# Patient Record
Sex: FEMALE | Race: WHITE | NOT HISPANIC OR LATINO | Employment: PART TIME | URBAN - METROPOLITAN AREA
[De-identification: names, ages, dates, MRNs, and addresses within clinical notes are randomized per-mention and may not be internally consistent; named-entity substitution may affect disease eponyms.]

---

## 2017-08-01 ENCOUNTER — PROCEDURE VISIT (OUTPATIENT)
Dept: OBSTETRICS AND GYNECOLOGY | Age: 22
End: 2017-08-01

## 2017-08-01 ENCOUNTER — OFFICE VISIT (OUTPATIENT)
Dept: OBSTETRICS AND GYNECOLOGY | Age: 22
End: 2017-08-01

## 2017-08-01 VITALS
BODY MASS INDEX: 22.66 KG/M2 | WEIGHT: 141 LBS | SYSTOLIC BLOOD PRESSURE: 100 MMHG | DIASTOLIC BLOOD PRESSURE: 68 MMHG | HEIGHT: 66 IN

## 2017-08-01 DIAGNOSIS — N20.0 KIDNEY STONES: ICD-10-CM

## 2017-08-01 DIAGNOSIS — K56.1 INTUSSUSCEPTION OF SMALL BOWEL (HCC): ICD-10-CM

## 2017-08-01 DIAGNOSIS — R10.2 PELVIC PAIN IN FEMALE: Primary | ICD-10-CM

## 2017-08-01 DIAGNOSIS — Z98.51 H/O TUBAL LIGATION: ICD-10-CM

## 2017-08-01 DIAGNOSIS — R10.32 LEFT LOWER QUADRANT ABDOMINAL PAIN OF UNKNOWN ETIOLOGY: Primary | ICD-10-CM

## 2017-08-01 DIAGNOSIS — R10.32 ABDOMINAL PAIN, LEFT LOWER QUADRANT: ICD-10-CM

## 2017-08-01 PROCEDURE — 99204 OFFICE O/P NEW MOD 45 MIN: CPT | Performed by: OBSTETRICS & GYNECOLOGY

## 2017-08-01 PROCEDURE — 76830 TRANSVAGINAL US NON-OB: CPT | Performed by: OBSTETRICS & GYNECOLOGY

## 2017-08-01 RX ORDER — ACETAMINOPHEN 325 MG/1
325 TABLET ORAL EVERY 6 HOURS PRN
COMMUNITY

## 2017-08-01 RX ORDER — ACETAMINOPHEN 500 MG
500 TABLET ORAL EVERY 6 HOURS PRN
COMMUNITY

## 2017-08-01 NOTE — PROGRESS NOTES
Subjective     Chief Complaint   Patient presents with   • Gynecologic Exam     New pt:LLQ pain since tubal 2 yrs ago       History of Present Illness  April Choi is a 22 y.o. female is being seen today for GYN consultation to evaluate her ongoing left lower quadrant pain. She is a  white female with below outlined medical history . She states she underwent a tubal ligation ,performed by Dr. Bala Varma in  and almost immediately noticed some subsequent left lower quadrant discomfort. It is described as intermittent, but at times intense. She recently had a CT scan that that revealed a small right ureteral stone ( nonobstructing), normal-appearing ovaries and uterus.  2 tubal ligation clips that were both identified on the left side, and a nonobstructing small bowel intussusception in the left lower quadrant.  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 She is sexually active without dyspareunia. No change in GI or  symptomatology. Cycles are usually 28-30 days and moderate in intensity and flow.  Chief Complaint   Patient presents with   • Gynecologic Exam     New pt:LLQ pain since tubal 2 yrs ago   .        The following portions of the patient's history were reviewed and updated as appropriate: allergies, current medications, past family history, past medical history, past social history, past surgical history and problem list.    PAST MEDICAL HISTORY  Past Medical History:   Diagnosis Date   • Abdominal pain    • Asthma    • Bronchitis    • History of chicken pox    • Kidney stones    • Migraine    • Pneumonia    • Scoliosis    • Yeast infection      OB History      Para Term  AB TAB SAB Ectopic Multiple Living    3 2 2       3        Past Surgical History:   Procedure Laterality Date   • TUBAL ABDOMINAL LIGATION       No family history on file.  History   Smoking Status   • Current Every Day Smoker    • Packs/day: 0.50   • Types: Cigarettes   Smokeless Tobacco   • Not on file       Current Outpatient Prescriptions:   •  acetaminophen (TYLENOL) 325 MG tablet, Take 325 mg by mouth Every 6 (Six) Hours As Needed for Mild Pain (1-3)., Disp: , Rfl:   •  acetaminophen (TYLENOL) 500 MG tablet, Take 500 mg by mouth Every 6 (Six) Hours As Needed for Mild Pain (1-3)., Disp: , Rfl:     There is no immunization history on file for this patient.    Review of Systems       Except as outlined in history of physical illness, patient denies any changes in her GYN, , GI systems. All other systems reviewed are negative.Interestingly the pain that she describes as almost always in the left lower quadrant close to her left anterior iliac crest    Objective   Physical Exam  General: Patient is alert and oriented and appears overall healthy  Neck: Is supple without thyromegaly, no carotid bruits and no lymphadenopathy  Breasts: Even and symmetrical, there is no lymphadenopathy, no erythema, no               discharge, no masses or tenderness  Lungs: Clear bilaterally, no wheezing, rhonchi, or rales.  Respiratory rate is normal  Heart: Regular rate and rhythm are appreciated, no murmurs or rubs are heard  Abdomen: Is soft, without organomegaly, bowel sounds are positive, there is no rebound or guarding. A small well-healed laparoscopic scar is noted in the inferior portion of the umbilicus. Moderate palpation next to the left anterior iliac spine causes abdominal wall discomfort.  Back: Nontender without CVA tenderness  Pelvic: External genitalia appear normal and consistent with mature female.  BUS normal                            Vagina is clean dry without discharge and appears adequately estrogenized, no               lesions or masses are present                         Cervix is noninflamed without discharge or lesions.  There is no cervical motion             tenderness.                Uterus is nonenlarged, without  tenderness, and no masses or abnormalities are  present               Adnexa are non-enlarged, non tender               Rectal exam reveals adequate sphincter tone and no masses or lesions are                     appreciated on digital rectal examination.    EMR Dragon/ Transcription disclaimer:  Much of the encounter note is an electronic transcription/translation of spoken language to printed text. The electronic translation of spoken language may permit erroneous, or at times, nonessential words or phrases to be inadvertently transcribes; Although i have reviewed the note for such errors, some may still exist.    Assessment/Plan   April was seen today for gynecologic exam.    Diagnoses and all orders for this visit:    Left lower quadrant abdominal pain of unknown etiology  Her pain is atypical and intermittent. In an effort to rule out any gynecological components a GYN ultrasound was performed today and found to be normal. A CT scan performed on July 31, 2017 revealed normal-appearing uterus and ovaries with no free fluid. Her exam today was only significant for abdominal wall pain with moderate palpation as outlined above. There was no uterine, adnexal, cervical discomfort even with deep palpation and movement. A vaginal panel swab was performed in an effort to rule out any forms of infection. There were no clinical indications of cervicitis or PID. Essentially there does not seem to be any significant gynecological component to this atypical left lower quadrant pain that she has been experiencing. . We will await the report of her vaginal culture/swabs.    Intussusception of small bowel  This is nonobstructing and patient was aware of this finding. She is scheduled for an endoscopy, and we discussed the possibility of surgical consultation at a later date. It is also discussed that this location is in the same quadrant of her abdomen that her pain is found.  The importance follow-up was reviewed with  April.    Kidney stones  Asymptomatic at present and on the opposite side of her abdominal discomfort    H/O tubal ligation  Had her tubal ligation in 2015 and was told it was an uneventful procedure. She is concerned that both clips are visualized on the left side. I discussed the potential different anatomical positions of the fallopian tubes and a woman's pelvis, as well as the angle of visualization,that  could have an effect on this finding. We also discussed that, if desired,  a hysterosalpingogram could be performed to definitively document bilateral tubal occlusion. Patient will give that some thought and if she would like to proceed with that procedure will either call our office or discuss with Dr. Lynch.     This very pleasant patient seemed to voice understanding of all of the issues we discussed.             EMR Dragon/ Transcription disclaimer:  Much of the encounter note is an electronic transcription/translation of spoken language to printed text. The electronic translation of spoken language may permit erroneous, or at times, nonessential words or phrases to be inadvertently transcribes; Although i have reviewed the note for such errors, some may still exist.

## 2017-08-03 LAB
C TRACH RRNA SPEC QL NAA+PROBE: NEGATIVE
N GONORRHOEA RRNA SPEC QL NAA+PROBE: NEGATIVE
T VAGINALIS RRNA SPEC QL NAA+PROBE: NEGATIVE

## 2017-08-23 ENCOUNTER — TELEPHONE (OUTPATIENT)
Dept: OBSTETRICS AND GYNECOLOGY | Age: 22
End: 2017-08-23

## 2017-08-23 NOTE — TELEPHONE ENCOUNTER
Pt called and decided she would like to proceed with Hysterosalpingogram as discussed at her appointment on 8/1/17.

## 2017-08-24 DIAGNOSIS — Z31.9 INFERTILITY MANAGEMENT: Primary | ICD-10-CM

## 2017-09-05 ENCOUNTER — HOSPITAL ENCOUNTER (OUTPATIENT)
Dept: GENERAL RADIOLOGY | Facility: HOSPITAL | Age: 22
Discharge: HOME | End: 2017-09-05
Attending: OBSTETRICS & GYNECOLOGY | Admitting: OBSTETRICS & GYNECOLOGY

## 2017-09-05 DIAGNOSIS — Z31.9 INFERTILITY MANAGEMENT: ICD-10-CM

## 2017-09-05 PROCEDURE — 74740 X-RAY FEMALE GENITAL TRACT: CPT

## 2018-12-05 ENCOUNTER — PROCEDURE VISIT (OUTPATIENT)
Dept: OBSTETRICS AND GYNECOLOGY | Age: 23
End: 2018-12-05

## 2018-12-05 ENCOUNTER — OFFICE VISIT (OUTPATIENT)
Dept: OBSTETRICS AND GYNECOLOGY | Age: 23
End: 2018-12-05

## 2018-12-05 VITALS
WEIGHT: 159 LBS | HEIGHT: 66 IN | SYSTOLIC BLOOD PRESSURE: 116 MMHG | BODY MASS INDEX: 25.55 KG/M2 | DIASTOLIC BLOOD PRESSURE: 62 MMHG

## 2018-12-05 DIAGNOSIS — R63.5 WEIGHT GAIN: ICD-10-CM

## 2018-12-05 DIAGNOSIS — N92.6 IRREGULAR BLEEDING: Primary | ICD-10-CM

## 2018-12-05 DIAGNOSIS — N92.6 IRREGULAR MENSES: Primary | ICD-10-CM

## 2018-12-05 DIAGNOSIS — R10.2 PELVIC PAIN: ICD-10-CM

## 2018-12-05 LAB
B-HCG UR QL: NEGATIVE
INTERNAL NEGATIVE CONTROL: NEGATIVE
INTERNAL POSITIVE CONTROL: POSITIVE
Lab: NORMAL

## 2018-12-05 PROCEDURE — 76830 TRANSVAGINAL US NON-OB: CPT | Performed by: PHYSICIAN ASSISTANT

## 2018-12-05 PROCEDURE — 81025 URINE PREGNANCY TEST: CPT | Performed by: PHYSICIAN ASSISTANT

## 2018-12-05 PROCEDURE — 99213 OFFICE O/P EST LOW 20 MIN: CPT | Performed by: PHYSICIAN ASSISTANT

## 2018-12-05 NOTE — PROGRESS NOTES
"Subjective     Chief Complaint   Patient presents with   • Gynecologic Exam     c/o heavy bleeding and abdominal cramping, had normal period on, then started bleeding and cramping on the .       April Choi is a 23 y.o.  whose LMP is Patient's last menstrual period was 2018. presents with irregular bleeding  Started normal period on the  and went through the   Started bleeding again on the  and had large blood clots  She went to work and within 2 hours her pad was saturated  Also notes pelvic pain  Says it happens daily 2-3 times a day or more  all her pain stared since her tubal ligation  Uses midol and tylenol that helps to ease it but doesn't go away  Seen at [facility] for irregular bleeding on the   She had an u/s done and blood work but was not told what was happening    She also has seen Dr Lynch for her issues.    She has an Intussusception of her small bowel and takes amitiza for her sx's    She does not believe her sx's are caused by that  Had her tubal done by Dr Varma but was referred to Dr Piedra by friends/family for a second opinion  She is overdue for her annual exam    No Additional Complaints Reported    The following portions of the patient's history were reviewed and updated as appropriate:vital signs, allergies, current medications, past family history, past medical history, past social history, past surgical history and problem list      Review of Systems   Genitourinary:positive for abnormal menstrual periods     Objective      /62   Ht 167.6 cm (66\")   Wt 72.1 kg (159 lb)   LMP 2018   Breastfeeding? No   BMI 25.66 kg/m²     Physical Exam    General:   alert, comfortable and no distress   Heart: Not performed today   Lungs: Not performed today.   Breast: Not performed today   Neck: na   Abdomen: {Not performed today   CVA: Not performed today   Pelvis: External genitalia: normal general appearance  Vaginal: normal mucosa " without prolapse or lesions and normal without tenderness, induration or masses  Cervix: normal appearance  Adnexa: normal bimanual exam and sl ttp on llq  Uterus: normal single, nontender   Extremities: Not performed today   Neurologic: negative   Psychiatric: Normal affect, judgement, and mood       Lab Review   Labs: Urine pregnancy test neg    Imaging   Ultrasound - Pelvic Vaginal  U/s shows endo thickness of 10.24 mm.  B ovaries wnl    Assessment/Plan     ASSESSMENT  1. Irregular bleeding    2. Pelvic pain    3. Weight gain        PLAN  1.   Orders Placed This Encounter   Procedures   • Urine Culture - Urine, Urine, Clean Catch   • TSH   • POC Pregnancy, Urine       2. U/S is reassuring. Disc tx options-BCP, meds.  Pt not able to tolerate NSAIDS but has had some success with diclofenac and will c/w that and heat.  I offered BCP but she says that made things worse and made her bleed more.  We discussed possible endometriosis, but no h/o these sx's prior to the TL.  Disc that a dxistic lap may be the most appropriate next step but also has risks associated with it.  She is going to schedule a f/u visit with Dr Piedra to discuss further.  She is also due for her annual exam  Pt declined STD testing.     Follow up: 4 week(s)    ELISA Winter  12/5/2018

## 2018-12-06 DIAGNOSIS — R79.89 LOW TSH LEVEL: Primary | ICD-10-CM

## 2018-12-06 LAB — TSH SERPL DL<=0.005 MIU/L-ACNC: <0.005 MIU/ML (ref 0.27–4.2)

## 2018-12-07 LAB
Lab: NORMAL
T3FREE SERPL-MCNC: 5.4 PG/ML (ref 2–4.4)
T4 FREE SERPL-MCNC: 2.02 NG/DL (ref 0.93–1.7)
WRITTEN AUTHORIZATION: NORMAL

## 2018-12-08 LAB
BACTERIA UR CULT: NORMAL
BACTERIA UR CULT: NORMAL

## 2018-12-18 ENCOUNTER — OFFICE VISIT (OUTPATIENT)
Dept: OBSTETRICS AND GYNECOLOGY | Age: 23
End: 2018-12-18

## 2018-12-18 VITALS
HEIGHT: 65 IN | WEIGHT: 158 LBS | DIASTOLIC BLOOD PRESSURE: 72 MMHG | BODY MASS INDEX: 26.33 KG/M2 | SYSTOLIC BLOOD PRESSURE: 118 MMHG

## 2018-12-18 DIAGNOSIS — Z00.00 ENCOUNTER FOR ANNUAL PHYSICAL EXAM: ICD-10-CM

## 2018-12-18 DIAGNOSIS — R10.84 GENERALIZED ABDOMINAL PAIN: ICD-10-CM

## 2018-12-18 DIAGNOSIS — N92.6 IRREGULAR MENSES: ICD-10-CM

## 2018-12-18 DIAGNOSIS — Z98.51 H/O TUBAL LIGATION: Primary | ICD-10-CM

## 2018-12-18 PROBLEM — Z13.29 THYROID DISORDER SCREENING: Status: ACTIVE | Noted: 2018-12-18

## 2018-12-18 PROCEDURE — 99395 PREV VISIT EST AGE 18-39: CPT | Performed by: OBSTETRICS & GYNECOLOGY

## 2018-12-18 RX ORDER — CYCLOBENZAPRINE HCL 5 MG
5 TABLET ORAL 3 TIMES DAILY PRN
COMMUNITY

## 2018-12-18 RX ORDER — SUMATRIPTAN 25 MG/1
25 TABLET, FILM COATED ORAL
COMMUNITY

## 2018-12-18 NOTE — PROGRESS NOTES
Subjective     Chief Complaint   Patient presents with   • Gynecologic Exam     Annual:discuss irregular bleeding,increased abdominal painand headaches         History of Present Illness      April Choi is a very pleasant  23 y.o. female who presents for annual exam.  Mammo Exam none, Contraception tubal ligation, Exercise regularly    Patient continues to have a difficult social situation. 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 She has some friends from [place] who were very supportive and lending help.    She was seen recently for some atypical abdominal pain that she thinks started after she had her tubes tied. At that time cultures were negative, and ultrasound was completely normal and pelvic exam was completely normal. CBC which had been done emergency room was normal as well. Since that time the pain is not as much of an issue. She is very physically active at work and at home.    She also states that she's had a long history of irregular cycles and we had asked that she see an endocrinologist for thyroid screening abnormalities. That appointment is scheduled  She's had no new sexual partners. STD screening previously was negative UCG negative.      Obstetric History:  OB History      Para Term  AB Living    3 2 2     3    SAB TAB Ectopic Molar Multiple Live Births              3         Menstrual History:     Patient's last menstrual period was 2018 (approximate).       Sexual History:       Past Medical History:   Diagnosis Date   • Abdominal pain    • Asthma    • Bronchitis    • History of chicken pox    • History of irregular menstrual bleeding    • Kidney stones    • Migraine    • Pneumonia    • Scoliosis    • Yeast infection      Past Surgical History:   Procedure Laterality Date   • TUBAL ABDOMINAL LIGATION         SOCIAL Hx:      The following portions of the patient's history were reviewed and updated as  "appropriate: allergies, current medications, past family history, past medical history, past social history, past surgical history and problem list.    Review of Systems        Except as outlined in history of physical illness, patient denies any changes in her GYN, , GI systems.  All other systems reviewed were negative.         Objective   Physical Exam    /72   Ht 165.1 cm (65\")   Wt 71.7 kg (158 lb)   LMP 11/17/2018 (Approximate)   BMI 26.29 kg/m²     General: Patient is alert and oriented and appears overall healthy  Neck: Is supple without thyromegaly, no carotid bruits and no lymphadenopathy  Lungs: Clear bilaterally, no wheezing, rhonchi, or rales.  Respiratory rate is normal  Breast: Even, symmetrical, no lymphadenopathy, no retraction, no masses or cysts  Heart: Regular rate and rhythm are appreciated, no murmurs or rubs are heard  Abdomen: Is soft, without organomegaly, bowel sounds are positive, there is no                                rebound or guarding and palpation does not produce any discomfort  Back: Nontender without CVA tenderness  Pelvic: External genitalia appear normal and consistent with mature female.  BUS normal                            Vagina is clean dry without discharge and appears adequately estrogenized, no               lesions or masses are present                         Cervix is noninflamed without discharge or lesions.  There is no cervical motion             tenderness.                Uterus is nonenlarged, without tenderness, and no masses or abnormalities are  present               Adnexa are non-enlarged, non tender               Rectal exam reveals adequate sphincter tone and no masses or lesions are                     appreciated on digital rectal examination.    Patient Active Problem List   Diagnosis   • Kidney stones   • H/O tubal ligation   • Thyroid disorder screening   • Generalized abdominal pain                 Assessment/Plan   April was seen " today for gynecologic exam.    Diagnoses and all orders for this visit:    H/O tubal ligation    Encounter for annual physical exam    Irregular menses  Patient has not had any success with birth control pills, Depo-Provera, Mirena IUD in the past. She asked about a D&C and endometrial ablation and we discussed the risk and benefits of that. She's previously had her tubes tied.  Generalized abdominal pain  Atypical, almost sounds strong intestinal an etiology. Her previous pelvic exam and ultrasound were completely normal. STD cultures were negative. UCG negative. Don't have a lot more to offer at this stage if we were to move towards an endometrial ablation if she discussed we could always do a diagnostic laparoscopy recently at tubal everything was normal    Difficult social situation      Annual Well Woman Exam    Discussed today's findings and concerns with patient in detail.  Continue to recommend regular exercise to include cardiovascular and resistance training and breast self-exam. Wellness lab, mammography, and pap smear, in accordance with age guidelines.  Nutritional  recommendations  were discussed.    All of the patient's questions were addressed and answered, I have encouraged her to call for today's test results if she has not received them within 10 days.  Patient is advised to call with any change in her condition or with any other questions, otherwise return in 12 months for annual examination.

## 2018-12-20 LAB
C TRACH RRNA CVX QL NAA+PROBE: NEGATIVE
CONV .: NORMAL
CYTOLOGIST CVX/VAG CYTO: NORMAL
CYTOLOGY CVX/VAG DOC THIN PREP: NORMAL
DX ICD CODE: NORMAL
HIV 1 & 2 AB SER-IMP: NORMAL
N GONORRHOEA RRNA CVX QL NAA+PROBE: NEGATIVE
OTHER STN SPEC: NORMAL
PATH REPORT.FINAL DX SPEC: NORMAL
STAT OF ADQ CVX/VAG CYTO-IMP: NORMAL
T VAGINALIS RRNA SPEC QL NAA+PROBE: NEGATIVE

## 2019-01-23 ENCOUNTER — OFFICE VISIT (OUTPATIENT)
Dept: ENDOCRINOLOGY | Age: 24
End: 2019-01-23

## 2019-01-23 VITALS
WEIGHT: 156 LBS | BODY MASS INDEX: 25.07 KG/M2 | SYSTOLIC BLOOD PRESSURE: 116 MMHG | DIASTOLIC BLOOD PRESSURE: 72 MMHG | HEIGHT: 66 IN | RESPIRATION RATE: 16 BRPM

## 2019-01-23 DIAGNOSIS — E05.90 HYPERTHYROIDISM: Primary | ICD-10-CM

## 2019-01-23 DIAGNOSIS — R53.82 CHRONIC FATIGUE: ICD-10-CM

## 2019-01-23 DIAGNOSIS — N92.6 IRREGULAR MENSES: ICD-10-CM

## 2019-01-23 DIAGNOSIS — N20.0 KIDNEY STONES: ICD-10-CM

## 2019-01-23 PROCEDURE — 99204 OFFICE O/P NEW MOD 45 MIN: CPT | Performed by: INTERNAL MEDICINE

## 2019-01-23 RX ORDER — METHIMAZOLE 10 MG/1
TABLET ORAL DAILY
Refills: 3 | COMMUNITY
Start: 2019-01-15 | End: 2019-01-23

## 2019-01-23 RX ORDER — VITAMIN B COMPLEX
1 CAPSULE ORAL DAILY
Qty: 30 CAPSULE | Refills: 11 | Status: SHIPPED | OUTPATIENT
Start: 2019-01-23 | End: 2020-01-23

## 2019-01-23 NOTE — PROGRESS NOTES
"Subjective   April Choi is a 23 y.o. female seen as a new patient for abnormal TSH. She states that she was given Methimazole by Dr. Kamran Lynch in 12/2018. She is having fatigue, weight fluctuation, irregular periods, hypoglycemic episodes, trouble sleeping, headaches, dizziness, light headedness. She states that she has to take a muscle relaxer to be able to sleep at night and she does not feel any better since starting Methimazole.     History of Present Illness this is a 23-year-old female who has been referred for further evaluation and treatment of recently diagnosed thyrotoxicosis.  She said she has been having problem with pronounced and progressive fatigue and weight gain and menstrual abnormalities and finally on 12/05/2018 as her gynecologist's office a thyroid function test on which showed significantly elevated free T4 and free T3 as well as suppressed TSH.  She is also complaining of pronounced weakness especially in the upper arms as well as her thighs even though previously she had been complaining of constipation she now has frequent bowel movements and has trouble going to sleep and feeling hot all the time.  Her family history is significantly positive for thyroid issues as well as type II diabetes on mother's side of the family.  She is  and has 2 children and works at [employer].    /72   Resp 16   Ht 167.6 cm (66\")   Wt 70.8 kg (156 lb)   BMI 25.18 kg/m²      Allergies   Allergen Reactions   • Aspirin Hives   • Ibuprofen Hives   • Naproxen Itching       Current Outpatient Medications:   •  acetaminophen (TYLENOL) 325 MG tablet, Take 325 mg by mouth Every 6 (Six) Hours As Needed for Mild Pain (1-3)., Disp: , Rfl:   •  acetaminophen (TYLENOL) 500 MG tablet, Take 500 mg by mouth Every 6 (Six) Hours As Needed for Mild Pain (1-3)., Disp: , Rfl:   •  cyclobenzaprine (FLEXERIL) 5 MG tablet, Take 5 mg by mouth 3 (Three) Times a Day As Needed for Muscle Spasms., Disp: , " Rfl:   •  diclofenac (VOLTAREN) 50 MG EC tablet, Take 50 mg by mouth 2 (Two) Times a Day As Needed., Disp: , Rfl:   •  methIMAzole (TAPAZOLE) 10 MG tablet, Take  by mouth Daily., Disp: , Rfl: 3  •  SUMAtriptan (IMITREX) 25 MG tablet, Take 25 mg by mouth Every 2 (Two) Hours As Needed for Migraine. Take one tablet at onset of headache. May repeat dose one time in 2 hours if headache not relieved., Disp: , Rfl:       The following portions of the patient's history were reviewed and updated as appropriate: allergies, current medications, past family history, past medical history, past social history, past surgical history and problem list.    Review of Systems   Constitutional: Positive for fatigue and unexpected weight change.   Eyes: Negative.    Respiratory: Negative.    Cardiovascular: Negative.    Gastrointestinal: Negative.    Endocrine: Negative.    Genitourinary: Positive for menstrual problem.   Musculoskeletal: Negative.    Skin: Negative.    Allergic/Immunologic: Negative.    Neurological: Positive for dizziness, light-headedness and headaches.   Hematological: Negative.    Psychiatric/Behavioral: Positive for sleep disturbance.       Objective   Physical Exam   Constitutional: She is oriented to person, place, and time. She appears well-developed and well-nourished. No distress.   Anxious and nervous   HENT:   Head: Normocephalic and atraumatic.   Right Ear: External ear normal.   Left Ear: External ear normal.   Nose: Nose normal.   Mouth/Throat: Oropharynx is clear and moist. No oropharyngeal exudate.   Eyes: Conjunctivae and EOM are normal. Pupils are equal, round, and reactive to light. Right eye exhibits no discharge. Left eye exhibits no discharge. No scleral icterus.   Neck: Normal range of motion. Neck supple. No JVD present. No tracheal deviation present. Thyromegaly present.   Diffuse enlargement of her thyroid with meaty consistency to about 3-4 times normal.  The entire goiter moves freely with  swallowing and it is nontender.   Cardiovascular: Normal rate, regular rhythm, normal heart sounds and intact distal pulses. Exam reveals no gallop and no friction rub.   No murmur heard.  Pulmonary/Chest: Effort normal and breath sounds normal. No stridor. No respiratory distress. She has no wheezes. She has no rales. She exhibits no tenderness.   Abdominal: Soft. Bowel sounds are normal. She exhibits no distension and no mass. There is no tenderness. There is no rebound and no guarding. No hernia.   Musculoskeletal: Normal range of motion. She exhibits no edema, tenderness or deformity.   Lymphadenopathy:     She has no cervical adenopathy.   Neurological: She is alert and oriented to person, place, and time. She has normal reflexes. She displays normal reflexes. No cranial nerve deficit or sensory deficit. She exhibits normal muscle tone. Coordination normal.   Risk Simone the reflexes.  She also has trouble getting up from squatting position.   Skin: Skin is warm and dry. No rash noted. She is not diaphoretic. No erythema. No pallor.   Psychiatric: She has a normal mood and affect. Her behavior is normal. Judgment and thought content normal.   Nursing note and vitals reviewed.        Assessment/Plan   Diagnoses and all orders for this visit:    Hyperthyroidism  -     T4 & TSH ([facility])  -     T4, Free  -     T3, Free  -     Uric Acid  -     Vitamin D 25 Hydroxy  -     Comprehensive Metabolic Panel  -     C-Peptide  -     Hemoglobin A1c  -     Follicle Stimulating Hormone  -     Lipid Panel  -     Luteinizing Hormone  -     Prolactin  -     ACTH  -     Cortisol  -     Calcium, Ionized  -     Phosphorus  -     PTH, Intact  -     Thyroid Stimulating Immunoglobulin  -     T3, Free; Future  -     T4 & TSH ([facility]); Future  -     T4, Free; Future  -     Uric Acid; Future  -     Vitamin D 25 Hydroxy; Future  -     Comprehensive Metabolic Panel; Future  -     C-Peptide; Future  -     Hemoglobin A1c; Future  -     Lipid  Panel; Future  -     NM Thyroid Uptake & Scan; Future  -     NM Thyroid Therapy Ablation Basic; Future    Chronic fatigue  -     T4 & TSH ([facility])  -     T4, Free  -     T3, Free  -     Uric Acid  -     Vitamin D 25 Hydroxy  -     Comprehensive Metabolic Panel  -     C-Peptide  -     Hemoglobin A1c  -     Follicle Stimulating Hormone  -     Lipid Panel  -     Luteinizing Hormone  -     Prolactin  -     ACTH  -     Cortisol  -     Calcium, Ionized  -     Phosphorus  -     PTH, Intact  -     Thyroid Stimulating Immunoglobulin  -     T3, Free; Future  -     T4 & TSH ([facility]); Future  -     T4, Free; Future  -     Uric Acid; Future  -     Vitamin D 25 Hydroxy; Future  -     Comprehensive Metabolic Panel; Future  -     C-Peptide; Future  -     Hemoglobin A1c; Future  -     Lipid Panel; Future    Irregular menses  -     T4 & TSH ([facility])  -     T4, Free  -     T3, Free  -     Uric Acid  -     Vitamin D 25 Hydroxy  -     Comprehensive Metabolic Panel  -     C-Peptide  -     Hemoglobin A1c  -     Follicle Stimulating Hormone  -     Lipid Panel  -     Luteinizing Hormone  -     Prolactin  -     ACTH  -     Cortisol  -     Calcium, Ionized  -     Phosphorus  -     PTH, Intact  -     Thyroid Stimulating Immunoglobulin  -     T3, Free; Future  -     T4 & TSH ([facility]); Future  -     T4, Free; Future  -     Uric Acid; Future  -     Vitamin D 25 Hydroxy; Future  -     Comprehensive Metabolic Panel; Future  -     C-Peptide; Future  -     Hemoglobin A1c; Future  -     Lipid Panel; Future    Kidney stones  -     T4 & TSH ([facility])  -     T4, Free  -     T3, Free  -     Uric Acid  -     Vitamin D 25 Hydroxy  -     Comprehensive Metabolic Panel  -     C-Peptide  -     Hemoglobin A1c  -     Follicle Stimulating Hormone  -     Lipid Panel  -     Luteinizing Hormone  -     Prolactin  -     ACTH  -     Cortisol  -     Calcium, Ionized  -     Phosphorus  -     PTH, Intact  -     Thyroid Stimulating Immunoglobulin  -     T3, Free;  Future  -     T4 & TSH ([facility]); Future  -     T4, Free; Future  -     Uric Acid; Future  -     Vitamin D 25 Hydroxy; Future  -     Comprehensive Metabolic Panel; Future  -     C-Peptide; Future  -     Hemoglobin A1c; Future  -     Lipid Panel; Future    Other orders  -     metoprolol tartrate (LOPRESSOR) 25 MG tablet; Take 1 tablet by mouth 2 (Two) Times a Day.  -     b complex vitamins capsule; Take 1 capsule by mouth Daily.               In summary I saw and examined this 23-year-old female for above-mentioned problems.  I reviewed her laboratory evaluation of 12/05/2018 and at this point we will go ahead and order a more extensive laboratory evaluation and once the results come back we will go ahead and call for any possible modification or new medications.  At this time we are also going to ask her to stop taking Tapazole and schedule her for radioactive iodine uptake and scan and if positive to be followed by radioactive iodine therapy.  I am also starting her on metoprolol 25 mg twice daily and vitamin  B complex 1 tablets daily.  I also asked her to a Yusuf intensive exercise until such time that she has become euthyroid.  She will see Ms. Kristin Gutierrez in 3 months or sooner if needed with laboratory evaluation prior to each office visit.

## 2019-01-24 LAB
25(OH)D3+25(OH)D2 SERPL-MCNC: 41 NG/ML (ref 30–100)
ACTH PLAS-MCNC: 10.4 PG/ML (ref 7.2–63.3)
ALBUMIN SERPL-MCNC: 4.9 G/DL (ref 3.5–5.2)
ALBUMIN/GLOB SERPL: 1.6 G/DL
ALP SERPL-CCNC: 100 U/L (ref 39–117)
ALT SERPL-CCNC: 18 U/L (ref 1–33)
AST SERPL-CCNC: 15 U/L (ref 1–32)
BILIRUB SERPL-MCNC: 0.3 MG/DL (ref 0.1–1.2)
BUN SERPL-MCNC: 8 MG/DL (ref 6–20)
BUN/CREAT SERPL: 13.1 (ref 7–25)
C PEPTIDE SERPL-MCNC: 1.9 NG/ML (ref 1.1–4.4)
CA-I SERPL ISE-MCNC: 5.4 MG/DL (ref 4.5–5.6)
CALCIUM SERPL-MCNC: 10.2 MG/DL (ref 8.6–10.5)
CHLORIDE SERPL-SCNC: 102 MMOL/L (ref 98–107)
CHOLEST SERPL-MCNC: 131 MG/DL (ref 0–200)
CO2 SERPL-SCNC: 29.1 MMOL/L (ref 22–29)
CORTIS SERPL-MCNC: 4 UG/DL
CREAT SERPL-MCNC: 0.61 MG/DL (ref 0.57–1)
FSH SERPL-ACNC: 5.9 MIU/ML
GLOBULIN SER CALC-MCNC: 3.1 GM/DL
GLUCOSE SERPL-MCNC: 89 MG/DL (ref 65–99)
HBA1C MFR BLD: 4.8 % (ref 4.8–5.6)
HDLC SERPL-MCNC: 64 MG/DL (ref 40–60)
INTERPRETATION: NORMAL
LDLC SERPL CALC-MCNC: 57 MG/DL (ref 0–100)
LH SERPL-ACNC: 8.5 MIU/ML
Lab: NORMAL
PHOSPHATE SERPL-MCNC: 3.6 MG/DL (ref 2.5–4.5)
POTASSIUM SERPL-SCNC: 4.9 MMOL/L (ref 3.5–5.2)
PROLACTIN SERPL-MCNC: 13 NG/ML (ref 4.8–23.3)
PROT SERPL-MCNC: 8 G/DL (ref 6–8.5)
PTH-INTACT SERPL-MCNC: 39 PG/ML (ref 15–65)
SODIUM SERPL-SCNC: 142 MMOL/L (ref 136–145)
T3FREE SERPL-MCNC: 5.3 PG/ML (ref 2–4.4)
T4 FREE SERPL-MCNC: 2.13 NG/DL (ref 0.93–1.7)
T4 SERPL-MCNC: 11.56 MCG/DL (ref 4.5–11.7)
TRIGL SERPL-MCNC: 48 MG/DL (ref 0–150)
TSH SERPL DL<=0.005 MIU/L-ACNC: <0.005 MIU/ML (ref 0.27–4.2)
TSI ACT/NOR SER: 4.5 IU/L (ref 0–0.55)
URATE SERPL-MCNC: 3.3 MG/DL (ref 2.4–5.7)
VLDLC SERPL CALC-MCNC: 9.6 MG/DL (ref 5–40)

## 2019-02-27 ENCOUNTER — HOSPITAL ENCOUNTER (OUTPATIENT)
Dept: NUCLEAR MEDICINE | Facility: HOSPITAL | Age: 24
Discharge: HOME | End: 2019-02-27

## 2019-04-15 ENCOUNTER — RESULTS ENCOUNTER (OUTPATIENT)
Dept: ENDOCRINOLOGY | Age: 24
End: 2019-04-15

## 2019-04-15 DIAGNOSIS — N20.0 KIDNEY STONES: ICD-10-CM

## 2019-04-15 DIAGNOSIS — N92.6 IRREGULAR MENSES: ICD-10-CM

## 2019-04-15 DIAGNOSIS — R53.82 CHRONIC FATIGUE: ICD-10-CM

## 2019-04-15 DIAGNOSIS — E05.90 HYPERTHYROIDISM: ICD-10-CM

## 2019-04-23 ENCOUNTER — HOSPITAL ENCOUNTER (OUTPATIENT)
Dept: URGENT CARE | Facility: CLINIC | Age: 24
Discharge: HOME | End: 2019-04-23
Attending: FAMILY MEDICINE

## 2019-04-25 DIAGNOSIS — R53.82 CHRONIC FATIGUE: ICD-10-CM

## 2019-04-25 DIAGNOSIS — E05.90 HYPERTHYROIDISM: ICD-10-CM

## 2019-04-25 DIAGNOSIS — E55.9 VITAMIN D DEFICIENCY, UNSPECIFIED: ICD-10-CM

## 2019-04-25 DIAGNOSIS — Z13.29 THYROID DISORDER SCREENING: ICD-10-CM

## 2019-04-25 DIAGNOSIS — N92.6 IRREGULAR MENSES: ICD-10-CM

## 2019-04-25 DIAGNOSIS — E05.90 HYPERTHYROIDISM: Primary | ICD-10-CM

## 2019-05-01 LAB
25(OH)D3+25(OH)D2 SERPL-MCNC: 39.3 NG/ML (ref 30–100)
ALBUMIN SERPL-MCNC: 4.7 G/DL (ref 3.5–5.2)
ALBUMIN/GLOB SERPL: 1.9 G/DL
ALP SERPL-CCNC: 108 U/L (ref 39–117)
ALT SERPL-CCNC: 26 U/L (ref 1–33)
AST SERPL-CCNC: 20 U/L (ref 1–32)
BILIRUB SERPL-MCNC: 0.4 MG/DL (ref 0.2–1.2)
BUN SERPL-MCNC: 12 MG/DL (ref 6–20)
BUN/CREAT SERPL: 26.1 (ref 7–25)
CALCIUM SERPL-MCNC: 10 MG/DL (ref 8.6–10.5)
CHLORIDE SERPL-SCNC: 104 MMOL/L (ref 98–107)
CO2 SERPL-SCNC: 23.4 MMOL/L (ref 22–29)
CREAT SERPL-MCNC: 0.46 MG/DL (ref 0.57–1)
FT4I SERPL CALC-MCNC: 6.5 (ref 1.2–4.9)
GLOBULIN SER CALC-MCNC: 2.5 GM/DL
GLUCOSE SERPL-MCNC: 100 MG/DL (ref 65–99)
POTASSIUM SERPL-SCNC: 4.4 MMOL/L (ref 3.5–5.2)
PROT SERPL-MCNC: 7.2 G/DL (ref 6–8.5)
SODIUM SERPL-SCNC: 140 MMOL/L (ref 136–145)
T3FREE SERPL-MCNC: 7.4 PG/ML (ref 2–4.4)
T3RU NFR SERPL: 42 % (ref 24–39)
T4 FREE SERPL-MCNC: 3.23 NG/DL (ref 0.93–1.7)
T4 SERPL-MCNC: 15.4 UG/DL (ref 4.5–12)
THYROGLOB AB SERPL-ACNC: 11.4 IU/ML (ref 0–0.9)
THYROGLOB SERPL-MCNC: 15 NG/ML
TSH SERPL DL<=0.005 MIU/L-ACNC: <0.006 UIU/ML (ref 0.45–4.5)

## 2019-05-09 ENCOUNTER — OFFICE VISIT (OUTPATIENT)
Dept: ENDOCRINOLOGY | Age: 24
End: 2019-05-09

## 2019-05-09 VITALS
DIASTOLIC BLOOD PRESSURE: 70 MMHG | HEIGHT: 66 IN | BODY MASS INDEX: 24.27 KG/M2 | WEIGHT: 151 LBS | SYSTOLIC BLOOD PRESSURE: 122 MMHG

## 2019-05-09 DIAGNOSIS — E05.90 HYPERTHYROIDISM: Primary | ICD-10-CM

## 2019-05-09 DIAGNOSIS — R53.82 CHRONIC FATIGUE: ICD-10-CM

## 2019-05-09 DIAGNOSIS — E55.9 VITAMIN D DEFICIENCY, UNSPECIFIED: ICD-10-CM

## 2019-05-09 PROCEDURE — 99214 OFFICE O/P EST MOD 30 MIN: CPT | Performed by: NURSE PRACTITIONER

## 2019-05-09 NOTE — PROGRESS NOTES
Subjective    April Choi is a 24 y.o. female. she is here for a follow up on hyperthyroidism.     hyperthyroid      Hyperthyroidism   This is a chronic problem. The current episode started more than 1 month ago. The problem occurs constantly. The problem has been gradually worsening. Associated symptoms include fatigue and headaches. Pertinent negatives include no myalgias or rash. Nothing aggravates the symptoms. Treatments tried: medication and testing. The treatment provided no relief.           Current medications:  Current Outpatient Medications   Medication Sig Dispense Refill   • acetaminophen (TYLENOL) 325 MG tablet Take 325 mg by mouth Every 6 (Six) Hours As Needed for Mild Pain (1-3).     • acetaminophen (TYLENOL) 500 MG tablet Take 500 mg by mouth Every 6 (Six) Hours As Needed for Mild Pain (1-3).     • b complex vitamins capsule Take 1 capsule by mouth Daily. 30 capsule 11   • cyclobenzaprine (FLEXERIL) 5 MG tablet Take 5 mg by mouth 3 (Three) Times a Day As Needed for Muscle Spasms.     • diclofenac (VOLTAREN) 50 MG EC tablet Take 50 mg by mouth 2 (Two) Times a Day As Needed.     • SUMAtriptan (IMITREX) 25 MG tablet Take 25 mg by mouth Every 2 (Two) Hours As Needed for Migraine. Take one tablet at onset of headache. May repeat dose one time in 2 hours if headache not relieved.     • metoprolol tartrate (LOPRESSOR) 25 MG tablet Take 1 tablet by mouth 2 (Two) Times a Day. Take 1/2 tab twice daily 30 tablet 4     No current facility-administered medications for this visit.        The following portions of the patient's history were reviewed and updated as appropriate:   Past Medical History:   Diagnosis Date   • Abdominal pain    • Asthma    • Bronchitis    • History of chicken pox    • History of irregular menstrual bleeding    • Kidney stones    • Migraine    • Pneumonia    • Scoliosis    • Yeast infection      Past Surgical History:   Procedure Laterality Date   • TUBAL ABDOMINAL LIGATION    "    History reviewed. No pertinent family history.  OB History      Para Term  AB Living    3 2 2     3    SAB TAB Ectopic Molar Multiple Live Births              3        Allergies   Allergen Reactions   • Aspirin Hives   • Ibuprofen Hives   • Naproxen Itching     Social History     Socioeconomic History   • Marital status:      Spouse name: Not on file   • Number of children: Not on file   • Years of education: Not on file   • Highest education level: Not on file   Tobacco Use   • Smoking status: Current Every Day Smoker     Packs/day: 0.50     Types: Cigarettes   • Smokeless tobacco: Never Used   Substance and Sexual Activity   • Alcohol use: Yes   • Drug use: No       Review of Systems  Review of Systems   Constitutional: Positive for fatigue.        \"burst of energy\"   HENT: Positive for trouble swallowing.    Eyes: Negative for visual disturbance.   Respiratory: Positive for choking.    Cardiovascular: Positive for palpitations.   Gastrointestinal: Negative for constipation.   Endocrine: Positive for cold intolerance.   Genitourinary: Positive for menstrual problem.   Musculoskeletal: Negative for myalgias.   Skin: Negative for rash.   Allergic/Immunologic: Negative.    Neurological: Positive for headaches.   Hematological: Bruises/bleeds easily.   Psychiatric/Behavioral: Positive for agitation, behavioral problems and decreased concentration. The patient is nervous/anxious.         Crying, to irritable, to \"mad\"   All other systems reviewed and are negative.        Objective    /70   Ht 167.6 cm (65.98\")   Wt 68.5 kg (151 lb)   BMI 24.38 kg/m²      Physical Exam   Constitutional: She is oriented to person, place, and time. She appears well-developed and well-nourished. No distress.   HENT:   Head: Normocephalic and atraumatic.   Eyes: EOM are normal. Pupils are equal, round, and reactive to light.   Neck: Normal range of motion. Neck supple.   Cardiovascular: Normal rate, regular " rhythm, normal heart sounds and intact distal pulses.   No murmur heard.  Pulmonary/Chest: Effort normal and breath sounds normal.   Abdominal: Soft. Bowel sounds are normal.   Musculoskeletal: Normal range of motion.   Neurological: She is alert and oriented to person, place, and time.   Skin: Skin is warm and dry. Capillary refill takes 2 to 3 seconds. She is not diaphoretic. No pallor.   Psychiatric: She has a normal mood and affect. Her behavior is normal. Judgment and thought content normal.   Nursing note and vitals reviewed.      Lab Review  Lab Results   Component Value Date    TSH <0.006 (L) 04/25/2019     Lab Results   Component Value Date    FREET4 3.23 (H) 04/25/2019     Orders Only on 04/25/2019   Component Date Value Ref Range Status   • Thyroglobulin Ab 04/25/2019 11.4* 0.0 - 0.9 IU/mL Final    Thyroglobulin Antibody measured by logolineup Methodology   • Thyroglobulin (TG-LALO) 04/25/2019 15  ng/mL Final    Comment: Reference Range:  Pubertal Children  and Adults: <40  According to the National Academy of Clinical [employer]ochemistry,  the reference interval for Thyroglobulin (TG) should be  related to euthyroid patients and not for patients who  underwent thyroidectomy.  TG reference intervals for these  patients depend on the residual mass of the thyroid tissue  left after surgery.  Establishing a post-operative baseline  is recommended.  The assay quantitation limit is 2.0 ng/mL.     • TSH 04/25/2019 <0.006* 0.450 - 4.500 uIU/mL Final   • T4, Total 04/25/2019 15.4* 4.5 - 12.0 ug/dL Final   • T3 Uptake 04/25/2019 42* 24 - 39 % Final   • Free Thyroxine Index 04/25/2019 6.5* 1.2 - 4.9 Final   • T3, Free 04/25/2019 7.4* 2.0 - 4.4 pg/mL Final   • Free T4 04/25/2019 3.23* 0.93 - 1.70 ng/dL Final   • 25 Hydroxy, Vitamin D 04/25/2019 39.3  30.0 - 100.0 ng/ml Final    Comment: Reference Range for Total Vitamin D 25(OH)  Deficiency <20.0 ng/mL  Insufficiency 21-29 ng/mL  Sufficiency  ng/mL  Toxicity >100  ng/ml     • Glucose 04/25/2019 100* 65 - 99 mg/dL Final   • BUN 04/25/2019 12  6 - 20 mg/dL Final   • Creatinine 04/25/2019 0.46* 0.57 - 1.00 mg/dL Final   • eGFR Non African Am 04/25/2019 >150  >60 mL/min/1.73 Final   • eGFR African Am 04/25/2019 >150  >60 mL/min/1.73 Final   • BUN/Creatinine Ratio 04/25/2019 26.1* 7.0 - 25.0 Final   • Sodium 04/25/2019 140  136 - 145 mmol/L Final   • Potassium 04/25/2019 4.4  3.5 - 5.2 mmol/L Final   • Chloride 04/25/2019 104  98 - 107 mmol/L Final   • Total CO2 04/25/2019 23.4  22.0 - 29.0 mmol/L Final   • Calcium 04/25/2019 10.0  8.6 - 10.5 mg/dL Final   • Total Protein 04/25/2019 7.2  6.0 - 8.5 g/dL Final   • Albumin 04/25/2019 4.70  3.50 - 5.20 g/dL Final   • Globulin 04/25/2019 2.5  gm/dL Final   • A/G Ratio 04/25/2019 1.9  g/dL Final   • Total Bilirubin 04/25/2019 0.4  0.2 - 1.2 mg/dL Final   • Alkaline Phosphatase 04/25/2019 108  39 - 117 U/L Final   • AST (SGOT) 04/25/2019 20  1 - 32 U/L Final   • ALT (SGPT) 04/25/2019 26  1 - 33 U/L Final      Results for ALMA MCELROY (MRN [id number]) as of 5/16/2019 09:58   Ref. Range 1/23/2019 16:28   Glucose Latest Ref Range: 65 - 99 mg/dL 89   Sodium Latest Ref Range: 136 - 145 mmol/L 142   Potassium Latest Ref Range: 3.5 - 5.2 mmol/L 4.9   CO2 Latest Ref Range: 22.0 - 29.0 mmol/L 29.1 (H)   Chloride Latest Ref Range: 98 - 107 mmol/L 102   Creatinine Latest Ref Range: 0.57 - 1.00 mg/dL 0.61   BUN Latest Ref Range: 6 - 20 mg/dL 8   BUN/Creatinine Ratio Latest Ref Range: 7.0 - 25.0  13.1   Calcium Latest Ref Range: 8.6 - 10.5 mg/dL 10.2   Ionized Calcium Latest Ref Range: 4.5 - 5.6 mg/dL 5.4   eGFR Non  Am Latest Ref Range: >60 mL/min/1.73 121   eGFR African Am Latest Ref Range: >60 mL/min/1.73 146   Alkaline Phosphatase Latest Ref Range: 39 - 117 U/L 100   Total Protein Latest Ref Range: 6.0 - 8.5 g/dL 8.0   Uric Acid Latest Ref Range: 2.4 - 5.7 mg/dL 3.3   ALT (SGPT) Latest Ref Range: 1 - 33 U/L 18   AST (SGOT) Latest Ref  Range: 1 - 32 U/L 15   Total Bilirubin Latest Ref Range: 0.1 - 1.2 mg/dL 0.3   Albumin Latest Ref Range: 3.50 - 5.20 g/dL 4.90   A/G Ratio Latest Units: g/dL 1.6   Phosphorus Latest Ref Range: 2.5 - 4.5 mg/dL 3.6   ACTH Latest Ref Range: 7.2 - 63.3 pg/mL 10.4   Cortisol Latest Units: ug/dL 4.0   LH Latest Units: mIU/mL 8.5   FSH Latest Units: mIU/mL 5.9   Prolactin Latest Ref Range: 4.8 - 23.3 ng/mL 13.0   Hemoglobin A1C Latest Ref Range: 4.80 - 5.60 % 4.80   C-Peptide Latest Ref Range: 1.1 - 4.4 ng/mL 1.9   PTH Intact (Serial Monitor) Latest Ref Range: 15 - 65 pg/mL 39   TSH Baseline Latest Ref Range: 0.270 - 4.200 mIU/mL <0.005 (L)   T4, Total Latest Ref Range: 4.50 - 11.70 mcg/dL 11.56   Free T4 Latest Ref Range: 0.93 - 1.70 ng/dL 2.13 (H)   T3, Free Latest Ref Range: 2.0 - 4.4 pg/mL 5.3 (H)   Thyroid Stimulating Immunoglobulin Latest Ref Range: 0.00 - 0.55 IU/L 4.50 (H)   Total Cholesterol Latest Ref Range: 0 - 200 mg/dL 131   HDL Cholesterol Latest Ref Range: 40 - 60 mg/dL 64 (H)   LDL Cholesterol  Latest Ref Range: 0 - 100 mg/dL 57   VLDL Cholesterol Latest Ref Range: 5 - 40 mg/dL 9.6   Triglycerides Latest Ref Range: 0 - 150 mg/dL 48   25 Hydroxy, Vitamin D Latest Ref Range: 30.0 - 100.0 ng/ml 41.0   Globulin Latest Units: gm/dL 3.1   PDF Image Unknown Not applicable   Interpretation Unknown Note       2.24.2019 [facility] scan -elevation 24 uptake measuring 62.2% consistent with hyperthyroid    Assessment/Plan      1. Hyperthyroidism    2. Vitamin D deficiency, unspecified    3. Chronic fatigue    . This diagnosis was discussed and reviewed with the patient including the advantages of drug therapy, radioactive iodine and surgery.     1. Orders placed during this encounter include:  Nuclear med ablation for hyperthyroid order was placed scheduled for May 15, 2019 at 240 in the afternoon.    2. Medications prescribed:  Outpatient Encounter Medications as of 5/9/2019   Medication Sig Dispense Refill   •  acetaminophen (TYLENOL) 325 MG tablet Take 325 mg by mouth Every 6 (Six) Hours As Needed for Mild Pain (1-3).     • acetaminophen (TYLENOL) 500 MG tablet Take 500 mg by mouth Every 6 (Six) Hours As Needed for Mild Pain (1-3).     • b complex vitamins capsule Take 1 capsule by mouth Daily. 30 capsule 11   • cyclobenzaprine (FLEXERIL) 5 MG tablet Take 5 mg by mouth 3 (Three) Times a Day As Needed for Muscle Spasms.     • diclofenac (VOLTAREN) 50 MG EC tablet Take 50 mg by mouth 2 (Two) Times a Day As Needed.     • SUMAtriptan (IMITREX) 25 MG tablet Take 25 mg by mouth Every 2 (Two) Hours As Needed for Migraine. Take one tablet at onset of headache. May repeat dose one time in 2 hours if headache not relieved.     • [DISCONTINUED] metoprolol tartrate (LOPRESSOR) 25 MG tablet Take 1 tablet by mouth 2 (Two) Times a Day. 60 tablet 11   • metoprolol tartrate (LOPRESSOR) 25 MG tablet Take 1 tablet by mouth 2 (Two) Times a Day. Take 1/2 tab twice daily 30 tablet 4     No facility-administered encounter medications on file as of 5/9/2019.        3. The risks and benefits of my recommendations, as well as other treatment options were discussed with the patient today. Questions were answered.      In summary:    Diagnoses and all orders for this visit:     1. Hyperthyroidism (Primary) chronic, uncontrolled.  Medication changes were as follows- Nuclear med ablation for hyperthyroid order was placed scheduled for May 15, 2019 at  240 in the afternoon.    -     metoprolol tartrate (LOPRESSOR) 25 MG tablet; Take 1 tablet by mouth 2 (Two) Times a Day. Take 1/2 tab twice daily  Dispense: 30 tablet; Refill: 4    2. Vitamin D deficiency, unspecified- chronic, stable no medication changes at this time. Refills prescribed. Future labs ordered for upcoming appointment and assessment.       3. Chronic fatigue- chronic, stable no medication changes at this time. Refills prescribed. Future labs ordered for upcoming appointment and  assessment.           4. Follow up: Return in about 3 months (around 8/9/2019). labs on 7.3.2019 and 3 months with Kristin-2 weeks prior for labs 6 months with Dr. Cagle-2 weeks prior for labs

## 2019-05-15 DIAGNOSIS — E05.90 HYPERTHYROIDISM: Primary | ICD-10-CM

## 2019-07-03 ENCOUNTER — RESULTS ENCOUNTER (OUTPATIENT)
Dept: ENDOCRINOLOGY | Age: 24
End: 2019-07-03

## 2019-07-03 DIAGNOSIS — E05.90 HYPERTHYROIDISM: ICD-10-CM

## 2019-08-16 ENCOUNTER — RESULTS ENCOUNTER (OUTPATIENT)
Dept: ENDOCRINOLOGY | Age: 24
End: 2019-08-16

## 2019-08-16 DIAGNOSIS — E05.90 HYPERTHYROIDISM: ICD-10-CM

## 2019-08-16 DIAGNOSIS — E55.9 VITAMIN D DEFICIENCY, UNSPECIFIED: ICD-10-CM
